# Patient Record
Sex: FEMALE | Race: BLACK OR AFRICAN AMERICAN | NOT HISPANIC OR LATINO | Employment: UNEMPLOYED | URBAN - METROPOLITAN AREA
[De-identification: names, ages, dates, MRNs, and addresses within clinical notes are randomized per-mention and may not be internally consistent; named-entity substitution may affect disease eponyms.]

---

## 2019-09-24 ENCOUNTER — OFFICE VISIT - HEALTHEAST (OUTPATIENT)
Dept: FAMILY MEDICINE | Facility: CLINIC | Age: 64
End: 2019-09-24

## 2019-09-24 DIAGNOSIS — R10.13 ABDOMINAL PAIN, EPIGASTRIC: ICD-10-CM

## 2019-09-24 LAB
ALBUMIN UR-MCNC: NEGATIVE MG/DL
APPEARANCE UR: CLEAR
BILIRUB UR QL STRIP: NEGATIVE
COLOR UR AUTO: YELLOW
GLUCOSE UR STRIP-MCNC: NEGATIVE MG/DL
HGB UR QL STRIP: NEGATIVE
KETONES UR STRIP-MCNC: NEGATIVE MG/DL
LEUKOCYTE ESTERASE UR QL STRIP: NEGATIVE
NITRATE UR QL: NEGATIVE
PH UR STRIP: 7 [PH] (ref 5–8)
SP GR UR STRIP: 1.01 (ref 1–1.03)
UROBILINOGEN UR STRIP-ACNC: NORMAL

## 2020-09-21 ENCOUNTER — OFFICE VISIT - HEALTHEAST (OUTPATIENT)
Dept: AUDIOLOGY | Facility: CLINIC | Age: 65
End: 2020-09-21

## 2020-09-21 ENCOUNTER — OFFICE VISIT - HEALTHEAST (OUTPATIENT)
Dept: OTOLARYNGOLOGY | Facility: CLINIC | Age: 65
End: 2020-09-21

## 2020-09-21 DIAGNOSIS — L29.9 EAR ITCH: ICD-10-CM

## 2020-09-21 DIAGNOSIS — H93.13 TINNITUS OF BOTH EARS: ICD-10-CM

## 2020-09-21 DIAGNOSIS — L29.9 ITCHING: ICD-10-CM

## 2020-09-21 DIAGNOSIS — L29.9 EAR ITCHING: ICD-10-CM

## 2020-09-21 DIAGNOSIS — H92.03 OTALGIA OF BOTH EARS: ICD-10-CM

## 2020-09-21 DIAGNOSIS — H92.03 EAR PAIN, BILATERAL: ICD-10-CM

## 2020-09-21 DIAGNOSIS — H90.3 SENSORINEURAL HEARING LOSS (SNHL) OF BOTH EARS: ICD-10-CM

## 2020-09-21 DIAGNOSIS — M26.609 TMJ (TEMPOROMANDIBULAR JOINT SYNDROME): ICD-10-CM

## 2020-09-21 DIAGNOSIS — H90.3 SENSORINEURAL HEARING LOSS, BILATERAL: ICD-10-CM

## 2021-01-11 ENCOUNTER — AMBULATORY - HEALTHEAST (OUTPATIENT)
Dept: OTOLARYNGOLOGY | Facility: CLINIC | Age: 66
End: 2021-01-11

## 2021-01-11 ENCOUNTER — OFFICE VISIT - HEALTHEAST (OUTPATIENT)
Dept: AUDIOLOGY | Facility: CLINIC | Age: 66
End: 2021-01-11

## 2021-01-11 ENCOUNTER — OFFICE VISIT - HEALTHEAST (OUTPATIENT)
Dept: OTOLARYNGOLOGY | Facility: CLINIC | Age: 66
End: 2021-01-11

## 2021-01-11 DIAGNOSIS — H90.3 SENSORINEURAL HEARING LOSS, BILATERAL: ICD-10-CM

## 2021-01-11 DIAGNOSIS — H90.3 SENSORINEURAL HEARING LOSS (SNHL) OF BOTH EARS: ICD-10-CM

## 2021-01-11 DIAGNOSIS — L29.9 EAR ITCHING: ICD-10-CM

## 2021-01-11 DIAGNOSIS — H92.09 EAR PAIN: ICD-10-CM

## 2021-01-11 DIAGNOSIS — R09.A2 GLOBUS SENSATION: ICD-10-CM

## 2021-03-09 ENCOUNTER — OFFICE VISIT - HEALTHEAST (OUTPATIENT)
Dept: OTOLARYNGOLOGY | Facility: CLINIC | Age: 66
End: 2021-03-09

## 2021-03-09 DIAGNOSIS — K11.20 SIALOADENITIS OF SUBMANDIBULAR GLAND: ICD-10-CM

## 2021-03-09 DIAGNOSIS — R09.A2 GLOBUS SENSATION: ICD-10-CM

## 2021-03-09 DIAGNOSIS — L29.9 EAR ITCHING: ICD-10-CM

## 2021-03-09 DIAGNOSIS — L29.9 ITCHING OF EAR: ICD-10-CM

## 2021-03-09 DIAGNOSIS — H90.3 SENSORINEURAL HEARING LOSS (SNHL) OF BOTH EARS: ICD-10-CM

## 2021-03-09 DIAGNOSIS — K21.9 LPRD (LARYNGOPHARYNGEAL REFLUX DISEASE): ICD-10-CM

## 2021-03-09 RX ORDER — POLYETHYLENE GLYCOL 3350 17 G/17G
17 POWDER, FOR SOLUTION ORAL
Status: SHIPPED | COMMUNITY
Start: 2020-02-28

## 2021-03-09 RX ORDER — FLUOCINONIDE 0.5 MG/G
CREAM TOPICAL
Qty: 30 G | Refills: 3 | Status: SHIPPED | OUTPATIENT
Start: 2021-03-09

## 2021-03-09 RX ORDER — CETIRIZINE HYDROCHLORIDE 10 MG/1
10 TABLET ORAL
Status: SHIPPED | COMMUNITY
Start: 2020-02-28

## 2021-06-01 NOTE — PROGRESS NOTES
Chief Complaint   Patient presents with     Abdominal Pain     9/23/19, started from the front to the back, has been having BMs, a little pain with urination       HPI      Patient is here for one day of moderate epigastric and upper abdominal pain radiating to her back, described as burning pain. Pain has no relationship to foods. No nausea, vomiting, fever, chills, urinary symptoms. No hx of abdominal surgeries.     ROS: Pertinent ROS noted in HPI.     No Known Allergies    There is no problem list on file for this patient.      No family history on file.    Social History     Socioeconomic History     Marital status:      Spouse name: Not on file     Number of children: Not on file     Years of education: Not on file     Highest education level: Not on file   Occupational History     Not on file   Social Needs     Financial resource strain: Not on file     Food insecurity:     Worry: Not on file     Inability: Not on file     Transportation needs:     Medical: Not on file     Non-medical: Not on file   Tobacco Use     Smoking status: Never Smoker     Smokeless tobacco: Never Used   Substance and Sexual Activity     Alcohol use: Not on file     Drug use: Not on file     Sexual activity: Not on file   Lifestyle     Physical activity:     Days per week: Not on file     Minutes per session: Not on file     Stress: Not on file   Relationships     Social connections:     Talks on phone: Not on file     Gets together: Not on file     Attends Jainism service: Not on file     Active member of club or organization: Not on file     Attends meetings of clubs or organizations: Not on file     Relationship status: Not on file     Intimate partner violence:     Fear of current or ex partner: Not on file     Emotionally abused: Not on file     Physically abused: Not on file     Forced sexual activity: Not on file   Other Topics Concern     Not on file   Social History Narrative     Not on file          Objective:    Vitals:    09/24/19 1748   BP: 108/70   Pulse: 94   Resp: 16   Temp: 99.4  F (37.4  C)   SpO2: 98%       Gen:NAD  CV: RRR  Pulm: CTAB  Abd: normal inspection, normal bowel sounds, soft, moderate pain to palpation at epigastric area, mild pain at RUQ and LUQ, normal palpation of lower abdomen, no mass/HSM  MSK: mild generalized tenderness to palpation of the entire back.      Recent Results (from the past 24 hour(s))   Urinalysis-UC if Indicated   Result Value Ref Range    Color, UA Yellow Colorless, Yellow, Straw, Light Yellow    Clarity, UA Clear Clear    Glucose, UA Negative Negative    Bilirubin, UA Negative Negative    Ketones, UA Negative Negative    Specific Gravity, UA 1.010 1.005 - 1.030    Blood, UA Negative Negative    pH, UA 7.0 5.0 - 8.0    Protein, UA Negative Negative mg/dL    Urobilinogen, UA 0.2 E.U./dL 0.2 E.U./dL, 1.0 E.U./dL    Nitrite, UA Negative Negative    Leukocytes, UA Negative Negative         Abdominal pain, epigastric  -     Urinalysis-UC if Indicated  -     aluminum-magnesium hydroxide-simethicone 15 mL, viscous lidocaine HC 15 mL (GI COCKTAIL)  -     ranitidine (ZANTAC) 150 MG tablet; Take 1 tablet (150 mg total) by mouth 2 (two) times a day.      Patient reported significant improvement with GI cocktail suggesting gastritis/PUD. NO further evaluation necessary. Will discharge with Zantac to f/u with PCP.

## 2021-06-03 VITALS
DIASTOLIC BLOOD PRESSURE: 70 MMHG | OXYGEN SATURATION: 98 % | SYSTOLIC BLOOD PRESSURE: 108 MMHG | RESPIRATION RATE: 16 BRPM | HEART RATE: 94 BPM | TEMPERATURE: 99.4 F | WEIGHT: 149 LBS

## 2021-06-11 NOTE — PROGRESS NOTES
CHIEF COMPLAINT:   Chief Complaint   Patient presents with     Ear Pain     BL ear pain/itching x 2 wks. No hearing loss.         HISTORY OF PRESENT ILLNESS    George was seen at the behest of No ref. provider found for ear pain x 2 weeks.  No associated drainage.   Very painful and itchy.   Pain radiates to her nose.   History via phone .   Alleviating/aggravating factors: none.   Patient is relatively poor historian.  She has had ear pain the past but not this bad.  Pain severity rating:            Chief Complaint   Patient presents with     Ear Pain     BL ear pain/itching x 2 wks. No hearing loss.            REVIEW OF SYSTEMS    Review of Systems: a 10-system review is reviewed at this encounter.  See scanned document.     Patient has no known allergies.     PHYSICAL EXAM:        HEAD: Normal appearance and symmetry:  No cutaneous lesions.      EARS:    Normal TM's bilaterally. Normal auditory canals and external ears.   Right TMJ is tender to palpation         NOSE:    Dorsum:   straight  Septum:  Normal  Mucosa:  moist  Inferior turbinates:  2+       ORAL CAVITY/OROPHARYNX:    Lips:  Normal.  Tongue: normal, midline  Mucosa:   no lesions  Tonsils:  2+      NECK:  Trachea:  midline.   Thyroid:  normal   Adenopathy:  none       NEURO:   Alert and Oriented    GAIT AND STATION:  normal     RESPIRATORY:   Symmetry and Respiratory effort         IMPRESSION:    Encounter Diagnoses   Name Primary?     Itching Yes     Ear itching      TMJ (temporomandibular joint syndrome)      Otalgia of both ears           RECOMMENDATIONS:      No orders of the defined types were placed in this encounter.     Medications Ordered   Medications     fluocinonide (LIDEX) 0.05 % cream     Sig: Apply small amount to ear canal daily as needed for itching     Dispense:  30 g     Refill:  3     methylPREDNISolone (MEDROL DOSEPACK) 4 mg tablet     Sig: Follow package directions (take with food)     Dispense:  21 tablet     Refill:  0          Patient is instructed regarding conservative measures to treat TMJ related pain.  This would include massage warm compress and soft diet.  We will treat her with a Medrol Dosepak just to bring the inflammation down and hopefully control her pain going forward.  She can use Lidex ointment daily to the affected ear for itching.  Audiogram today shows some high-frequency hearing loss.  Recommend return visit in 1 year for repeat hearing test.    Follow-up on an as-needed basis

## 2021-06-15 NOTE — PROGRESS NOTES
REASON FOR VISIT: Recheck      HISTORY OF PRESENT ILLNESS    George was seen in follow up for LPR D and ear itching.  She was complaining last time of excessive mucus in the throat and was placed on omeprazole.  This seems to have gotten better but patient is really not concerned about this issues where she is really more concerned about the itching as of her years which is keeping her up at night.  The itching is to the point where she feels like there is a bug crawling around and she cannot sleep.  She is also complaining about a little bit of swelling in the floor of her mouth.  She refuses to fill out her reflux symptom questionnaire today.    IMPRESSION:          Encounter Diagnoses   Name Primary?     Sensorineural hearing loss (SNHL) of both ears Yes     Ear itching              RECOMMENDATIONS:        Symptoms are consistent with LPRD.  Exam is normal with the exception exception of thickened secretions.  We will stop the Zantac and start her on omeprazole 20 mg in the morning.  Return in 2 months.  All questions were answered she is agreeable to plan of care       REVIEW OF SYSTEMS    Review of Systems: a 10-system review is reviewed at this encounter.  See scanned document.     Patient has no known allergies.     PHYSICAL EXAM:        HEAD: Normal appearance and symmetry:  No cutaneous lesions.      EARS:   Auricles normal    External auditory canals are within normal limits tympanic membrane's are intact     NOSE:    Dorsum:   straight       ORAL CAVITY/OROPHARYNX:    Floor of mouth shows a little bit of swelling at this of the submandibular duct on the left-hand side with just a little bit of inspissated thickened mucus that is expressed with a Q-tip  Lips:  Normal.     NECK:  Trachea:  midline       NEURO:   Alert and Oriented    GAIT AND STATION:  normal     RESPIRATORY:   Symmetry and Respiratory effort    PSYCH:   normal mood and affect    SKIN:  warm and dry         IMPRESSION:    Encounter Diagnoses    Name Primary?     Sensorineural hearing loss (SNHL) of both ears Yes     Globus sensation      LPRD (laryngopharyngeal reflux disease)      Itching of ear      Ear itching      Sialoadenitis of submandibular gland           RECOMMENDATIONS:    Patient is rewritten for Lidex cream that she says the pharmacy would not fill last time.  I will write it for downstairs so she can get it immediately she can apply this daily with a Q-tip.  For the floor mild swelling I think this is some I did encourage her to drink plenty of fluids and massage the submandibular gland as needed she can also try sour candies such as lemon drops to help with salivation.  All questions were answered she is agreeable to this plan of care    No orders of the defined types were placed in this encounter.     Medications Ordered   Medications     fluocinonide (LIDEX) 0.05 % cream     Sig: Apply small amount to ear canal daily as needed for itching     Dispense:  30 g     Refill:  3

## 2021-06-18 NOTE — PATIENT INSTRUCTIONS - HE
"Patient Instructions by Ranjan Brock MD at 9/21/2020 10:00 AM     Author: Ranjan Brock MD Service: -- Author Type: Physician    Filed: 9/21/2020 10:18 AM Encounter Date: 9/21/2020 Status: Addendum    : Ranjan Brock MD (Physician)    Related Notes: Original Note by Ranjan Brock MD (Physician) filed at 9/21/2020 10:16 AM           Bend wire to 90 degrees so that the \"elbow) is just at the point where the cotton swab begins.  (this will prevent you from putting the cotton tip in too far and damaging the eardrum)  Apply small amount of steroid cream to cotton tip.  The cream is potent so you only need a small dab on the swab. Patient Education     TMJ Syndrome  The temporomandibular joint (TMJ) is the joint that connects your lower jaw to your head. You can feel it in front of your ears when you open and close your mouth. TMJ disorders involve chronic or recurrent pain in the joint. When treated, symptoms of TMJ disorders usually go away within a few months.  Causes  There is no widely agreed-on cause of TMJ disorders. They have been linked to injury, arthritis, chronic fatigue syndrome, and fibromyalgia. A definite connection has not been shown, though.  Symptoms    Pain in the face, jaw, or neck    Pain with jaw movement or chewing    Locking or catching sensation of the jaw    Clicking, popping, or grinding sounds with movement of the TMJ    Headache    Ear pain  Home care  Modest, nonsurgical treatments are a good first step toward relieving symptoms. Try the approaches described below.    Rest the jaw by avoiding crunchy or hard-to-chew foods. Dont eat hard or sticky candies. Soft foods and liquids are easier on the jaw.    Protect your jaw while yawning. If you need to yawn, put your fist under your chin to prevent your mouth from opening up too wide.    To help relieve pain, try applying hot or cold packs to the painful area. Try both hot and cold to find out which works best for you. To make a cold " pack, put ice cubes in a plastic bag that seals at the top. Wrap the bag in a clean, thin towel or cloth. Never put ice or an ice pack directly on the skin. If you use hot packs (small towels soaked in hot water), be careful not to burn yourself.    You may take acetaminophen or ibuprofen for pain, unless you were given a different pain medicine. (Note: If you have chronic liver or kidney disease or have ever had a stomach ulcer or gastrointestinal bleeding, talk with your healthcare provider before using these medicines. Also talk to your provider if you are taking medicine to prevent blood clots.) Dont give aspirin to a child younger than age 19 unless directed by the aleta provider. Taking aspirin can put a child at risk for Reye syndrome. This is a rare but very serious disorder that most often affects the brain and the liver.  Reducing stress  If stress seems to be contributing to your symptoms, try to identify the sources of stress in your life. These arent always obvious. Common stressors include:    Everyday hassles. These include things such as traffic jams, missed appointments, or car trouble.    Major life changes. These can be good, such as a new baby or job promotion. And they can be bad, such as losing a job or losing a loved one.    Overload. The feeling that you have too many responsibilities and can't take care of everything at once.    Helplessness. Feeling like your problems are more than you can solve.  When possible, do something about your sources of stress. See if you can avoid hassles, limit the amount of change in your life at one time, and take breaks when you feel overloaded.  Unfortunately, many stressful situations cannot be avoided. So learning how to manage stress better is very important. Getting regular exercise, eating nutritious, balanced meals, and getting adequate rest all help to make everyday stress more manageable. Certain techniques are also helpful: relaxation and breathing  exercises, visualization, biofeedback, meditation, or simply taking some time out to clear your mind. For more information, talk with your healthcare provider.  Follow-up care  Follow up with your healthcare provider, or as advised. Further testing and additional treatment may be required. If changes to your lifestyle do not improve your symptoms, talk with your healthcare provider about other available therapies. These include bite guards for help with teeth grinding, stress management techniques, and more. If stress is an important factor and does not respond to the above simple measures, talk with your healthcare provider about a referral for stress management.  If X-rays were done, they will be reviewed by a specialist. You will be notified of the results, especially if they affect treatment.  Call 911  Call 911 if any of these occur:    Trouble breathing or swallowing, wheezing    Confusion    Extreme drowsiness or trouble awakening    Fainting or loss of consciousness    Rapid heart rate  When to seek medical advice  Call your healthcare provider right away if any of these occur:    Swollen or red face    Pain gets worse    Neck, mouth, tooth, or throat pain gets worse    Fever of 100.4 F (38 C) or higher, or as directed by your healthcare provider  Date Last Reviewed: 10/1/2017    9624-4878 The [employer]). [address] [place] [address], [place] [address]. All rights reserved. This information is not intended as a substitute for professional medical care. Always follow your healthcare professional's instructions.

## 2021-06-29 NOTE — PROGRESS NOTES
"Progress Notes by Viri Cornejo AuD at 9/21/2020  9:20 AM     Author: Viri Cornejo AuD Service: -- Author Type: Audiologist    Filed: 9/21/2020 10:26 AM Encounter Date: 9/21/2020 Status: Signed    : Viri Cornejo AuD (Audiologist)       Audiology Report    Referring Provider:  Dr. Brock    Summary: Audiology visit completed. A telephone  is used during the visit. Please see audiogram below or in \"media\" tab for case history and results.      Transducer: Insert earphones and Circumaural headphones    Reliability was good  and there was fair  SDT to PTA agreement.       Plan:  The patient is returned to ENT for follow up.  She should return for retesting with ENT recommendation.      Kenna Leblanc, [facility]  [place] Licensed Audiologist #[id number]                      "

## 2021-06-30 NOTE — PROGRESS NOTES
Progress Notes by Ranjan Brock MD at 1/11/2021  9:20 AM     Author: Ranjan Brock MD Service: -- Author Type: Physician    Filed: 1/11/2021 10:28 AM Encounter Date: 1/11/2021 Status: Signed    : Ranjan Brock MD (Physician)       REASON FOR VISIT: Recheck      HISTORY OF PRESENT ILLNESS    George was seen in follow up for hearing loss and ear eczema.  Patient had improvement with the ear cream but she says she is worn out.  It did help with the ear itching.  She continues to have throat pain and itching on a regular basis she did have some temporary improvement with the Medrol Dosepak.  Other symptoms include excessive mucus and throat clearing.  RSI is administered to the patient via  she scores a 27.    IMPRESSION:          Encounter Diagnoses   Name Primary?   ? Itching Yes   ? Ear itching     ? TMJ (temporomandibular joint syndrome)     ? Otalgia of both ears              RECOMMENDATIONS:        No orders of the defined types were placed in this encounter.          Medications Ordered   Medications   ? fluocinonide (LIDEX) 0.05 % cream       Sig: Apply small amount to ear canal daily as needed for itching       Dispense:  30 g       Refill:  3   ? methylPREDNISolone (MEDROL DOSEPACK) 4 mg tablet       Sig: Follow package directions (take with food)       Dispense:  21 tablet       Refill:  0         Patient is instructed regarding conservative measures to treat TMJ related pain.  This would include massage warm compress and soft diet.  We will treat her with a Medrol Dosepak just to bring the inflammation down and hopefully control her pain going forward.  She can use Lidex ointment daily to the affected ear for itching.  Audiogram today shows some high-frequency hearing loss.  Recommend return visit in 1 year for repeat hearing test.     Follow-up on an as-needed basis              Initial consult on 9/21/2020         REVIEW OF SYSTEMS    Review of Systems: a 10-system review is reviewed at  this encounter.  See scanned document.     Patient has no known allergies.     PHYSICAL EXAM:        HEAD: Normal appearance and symmetry:  No cutaneous lesions.      EARS:   Auricles normal         NOSE:    Dorsum:   straight       ORAL CAVITY/OROPHARYNX:    Lips:  Normal.     NECK:  Trachea:  midline       NEURO:   Alert and Oriented    GAIT AND STATION:  normal     RESPIRATORY:   Symmetry and Respiratory effort    PSYCH:   normal mood and affect    SKIN:  warm and dry     Flexible laryngoscopy was performed in the clinic after obtaining verbal consent.  The nasal cavity is clear nasopharynx is clear pharynx is within normal limits as well is the larynx.  The true vocal cords are sharp with good movement.  There are some copious secretions that are thickened and mucoid in nature.    IMPRESSION:    Encounter Diagnoses   Name Primary?   ? Sensorineural hearing loss (SNHL) of both ears Yes   ? Ear itching           RECOMMENDATIONS:      Symptoms are consistent with LPR D.  Exam is normal with the exception exception of thickened secretions.  We will stop the Zantac and start her on omeprazole 20 mg in the morning.  Return in 2 months.  All questions were answered she is agreeable to plan of care

## 2021-06-30 NOTE — PROGRESS NOTES
"Progress Notes by Patsy Peñaloza AuD at 1/11/2021  8:40 AM     Author: Patsy Peñaloza AuD Service: -- Author Type: Audiologist    Filed: 1/11/2021  9:31 AM Encounter Date: 1/11/2021 Status: Signed    : Patsy Peñaloza AuD (Audiologist)       Audiology Report    Summary: Audiology visit completed. Please see audiogram below or in \"media\" tab for case history and results.     Plan: The patient is returned to ENT for follow up. Return for retesting with ENT recommendation.     Kenna Martinez, CCC-A  Clinical Audiologist   [place] #[id number]             "

## 2021-07-04 NOTE — ADDENDUM NOTE
Addendum Note by Yue Brock MD at 3/9/2021 10:20 AM     Author: Yue Brock MD Service: -- Author Type: Physician    Filed: 3/9/2021 10:48 AM Encounter Date: 3/9/2021 Status: Signed    : Yue Brock MD (Physician)    Addended by: YUE BROCK on: 3/9/2021 10:48 AM        Modules accepted: Level of Service

## 2021-10-26 ENCOUNTER — APPOINTMENT (OUTPATIENT)
Dept: CT IMAGING | Facility: HOSPITAL | Age: 66
End: 2021-10-26
Attending: EMERGENCY MEDICINE
Payer: COMMERCIAL

## 2021-10-26 ENCOUNTER — HOSPITAL ENCOUNTER (EMERGENCY)
Facility: HOSPITAL | Age: 66
Discharge: HOME | End: 2021-10-26
Attending: EMERGENCY MEDICINE | Admitting: EMERGENCY MEDICINE
Payer: COMMERCIAL

## 2021-10-26 VITALS
SYSTOLIC BLOOD PRESSURE: 130 MMHG | WEIGHT: 160 LBS | TEMPERATURE: 99.1 F | OXYGEN SATURATION: 96 % | RESPIRATION RATE: 16 BRPM | HEART RATE: 81 BPM | DIASTOLIC BLOOD PRESSURE: 77 MMHG

## 2021-10-26 DIAGNOSIS — N12 PYELONEPHRITIS: ICD-10-CM

## 2021-10-26 LAB
ALBUMIN SERPL-MCNC: 3.9 G/DL (ref 3.5–5)
ALBUMIN UR-MCNC: NEGATIVE MG/DL
ALP SERPL-CCNC: 99 U/L (ref 45–120)
ALT SERPL W P-5'-P-CCNC: 13 U/L (ref 0–45)
ANION GAP SERPL CALCULATED.3IONS-SCNC: 10 MMOL/L (ref 5–18)
APPEARANCE UR: CLEAR
AST SERPL W P-5'-P-CCNC: 16 U/L (ref 0–40)
ATRIAL RATE - MUSE: 106 BPM
BASOPHILS # BLD AUTO: 0 10E3/UL (ref 0–0.2)
BASOPHILS NFR BLD AUTO: 1 %
BILIRUB SERPL-MCNC: 0.4 MG/DL (ref 0–1)
BILIRUB UR QL STRIP: NEGATIVE
BUN SERPL-MCNC: 10 MG/DL (ref 8–22)
C REACTIVE PROTEIN LHE: 1.3 MG/DL (ref 0–0.8)
CALCIUM SERPL-MCNC: 9.7 MG/DL (ref 8.5–10.5)
CHLORIDE BLD-SCNC: 106 MMOL/L (ref 98–107)
CO2 SERPL-SCNC: 24 MMOL/L (ref 22–31)
COLOR UR AUTO: ABNORMAL
CREAT SERPL-MCNC: 0.71 MG/DL (ref 0.6–1.1)
DIASTOLIC BLOOD PRESSURE - MUSE: NORMAL MMHG
EOSINOPHIL # BLD AUTO: 0.1 10E3/UL (ref 0–0.7)
EOSINOPHIL NFR BLD AUTO: 2 %
ERYTHROCYTE [DISTWIDTH] IN BLOOD BY AUTOMATED COUNT: 12.6 % (ref 10–15)
GFR SERPL CREATININE-BSD FRML MDRD: 88 ML/MIN/1.73M2
GLUCOSE BLD-MCNC: 116 MG/DL (ref 70–125)
GLUCOSE UR STRIP-MCNC: NEGATIVE MG/DL
HCT VFR BLD AUTO: 39.4 % (ref 35–47)
HGB BLD-MCNC: 12.7 G/DL (ref 11.7–15.7)
HGB UR QL STRIP: NEGATIVE
IMM GRANULOCYTES # BLD: 0 10E3/UL
IMM GRANULOCYTES NFR BLD: 0 %
INR PPP: 1.08 (ref 0.9–1.15)
INTERPRETATION ECG - MUSE: NORMAL
KETONES UR STRIP-MCNC: NEGATIVE MG/DL
LEUKOCYTE ESTERASE UR QL STRIP: ABNORMAL
LIPASE SERPL-CCNC: 14 U/L (ref 0–52)
LYMPHOCYTES # BLD AUTO: 3.1 10E3/UL (ref 0.8–5.3)
LYMPHOCYTES NFR BLD AUTO: 55 %
MCH RBC QN AUTO: 28.7 PG (ref 26.5–33)
MCHC RBC AUTO-ENTMCNC: 32.2 G/DL (ref 31.5–36.5)
MCV RBC AUTO: 89 FL (ref 78–100)
MONOCYTES # BLD AUTO: 0.4 10E3/UL (ref 0–1.3)
MONOCYTES NFR BLD AUTO: 7 %
MUCOUS THREADS #/AREA URNS LPF: PRESENT /LPF
NEUTROPHILS # BLD AUTO: 2 10E3/UL (ref 1.6–8.3)
NEUTROPHILS NFR BLD AUTO: 35 %
NITRATE UR QL: NEGATIVE
NRBC # BLD AUTO: 0 10E3/UL
NRBC BLD AUTO-RTO: 0 /100
P AXIS - MUSE: 31 DEGREES
PH UR STRIP: 6.5 [PH] (ref 5–7)
PLATELET # BLD AUTO: 231 10E3/UL (ref 150–450)
POTASSIUM BLD-SCNC: 4.1 MMOL/L (ref 3.5–5)
PR INTERVAL - MUSE: 116 MS
PROT SERPL-MCNC: 7.9 G/DL (ref 6–8)
QRS DURATION - MUSE: 78 MS
QT - MUSE: 328 MS
QTC - MUSE: 435 MS
R AXIS - MUSE: -4 DEGREES
RBC # BLD AUTO: 4.43 10E6/UL (ref 3.8–5.2)
RBC URINE: 1 /HPF
SODIUM SERPL-SCNC: 140 MMOL/L (ref 136–145)
SP GR UR STRIP: 1.02 (ref 1–1.03)
SQUAMOUS EPITHELIAL: 1 /HPF
SYSTOLIC BLOOD PRESSURE - MUSE: NORMAL MMHG
T AXIS - MUSE: 34 DEGREES
UROBILINOGEN UR STRIP-MCNC: <2 MG/DL
VENTRICULAR RATE- MUSE: 106 BPM
WBC # BLD AUTO: 5.5 10E3/UL (ref 4–11)
WBC URINE: 3 /HPF

## 2021-10-26 PROCEDURE — 85049 AUTOMATED PLATELET COUNT: CPT | Performed by: EMERGENCY MEDICINE

## 2021-10-26 PROCEDURE — 36415 COLL VENOUS BLD VENIPUNCTURE: CPT | Performed by: EMERGENCY MEDICINE

## 2021-10-26 PROCEDURE — 99285 EMERGENCY DEPT VISIT HI MDM: CPT | Mod: 25

## 2021-10-26 PROCEDURE — 81001 URINALYSIS AUTO W/SCOPE: CPT | Performed by: EMERGENCY MEDICINE

## 2021-10-26 PROCEDURE — 74176 CT ABD & PELVIS W/O CONTRAST: CPT

## 2021-10-26 PROCEDURE — 86141 C-REACTIVE PROTEIN HS: CPT | Performed by: EMERGENCY MEDICINE

## 2021-10-26 PROCEDURE — 85610 PROTHROMBIN TIME: CPT | Performed by: EMERGENCY MEDICINE

## 2021-10-26 PROCEDURE — 82040 ASSAY OF SERUM ALBUMIN: CPT | Performed by: EMERGENCY MEDICINE

## 2021-10-26 PROCEDURE — 250N000013 HC RX MED GY IP 250 OP 250 PS 637: Performed by: EMERGENCY MEDICINE

## 2021-10-26 PROCEDURE — 83690 ASSAY OF LIPASE: CPT | Performed by: EMERGENCY MEDICINE

## 2021-10-26 RX ORDER — CEPHALEXIN 500 MG/1
500 CAPSULE ORAL 2 TIMES DAILY
Qty: 20 CAPSULE | Refills: 0 | Status: SHIPPED | OUTPATIENT
Start: 2021-10-26 | End: 2021-11-05

## 2021-10-26 RX ORDER — ACETAMINOPHEN 325 MG/1
975 TABLET ORAL ONCE
Status: COMPLETED | OUTPATIENT
Start: 2021-10-26 | End: 2021-10-26

## 2021-10-26 RX ORDER — ONDANSETRON 2 MG/ML
4 INJECTION INTRAMUSCULAR; INTRAVENOUS EVERY 30 MIN PRN
Status: DISCONTINUED | OUTPATIENT
Start: 2021-10-26 | End: 2021-10-26 | Stop reason: HOSPADM

## 2021-10-26 RX ORDER — KETOROLAC TROMETHAMINE 15 MG/ML
15 INJECTION, SOLUTION INTRAMUSCULAR; INTRAVENOUS ONCE
Status: DISCONTINUED | OUTPATIENT
Start: 2021-10-26 | End: 2021-10-26 | Stop reason: HOSPADM

## 2021-10-26 RX ADMIN — ACETAMINOPHEN 975 MG: 325 TABLET ORAL at 12:42

## 2021-10-26 RX ADMIN — Medication 50 MG: at 12:44

## 2021-10-26 ASSESSMENT — ENCOUNTER SYMPTOMS
FEVER: 0
FLANK PAIN: 1
ABDOMINAL PAIN: 1
CHILLS: 0
FREQUENCY: 0
HEMATURIA: 0
DYSURIA: 0

## 2021-10-26 NOTE — DISCHARGE INSTRUCTIONS
Call your primary care today to schedule follow up on Friday or Monday    If you have fever, vomiting, worsening pain, or any concerning symptoms, return to the ER.    Take over the counter tylenol 1000mg every 6 hours as needed for pain

## 2021-10-26 NOTE — ED TRIAGE NOTES
Patient with LUQ abdominal pain with radiation into the flank since 0730.  Denies any urinary symptoms and denies any N/V.  States has had pain like this before but never evaluated.

## 2021-10-26 NOTE — ED PROVIDER NOTES
Patient information was obtained from: Patient    Use of Intrepreter: Yes (Family Member) - Language Nitza Myrick is a 66 year old female with a pertinent history of GERD who presents for evaluation of left sided flank pain near kidney, onset 7:30 AM. Describes it as a burning in her back. No urinary symptoms, fever, or chills. No history of kidney stones.      Concern for pyelonephritis vs. Ureterolith. Less likely aortic pathology based on hx, exam. Plan for lab assay to includeUA, and CT r/o stone. Toradol held d/tage >65 with last available CrCl from 2018       Sessions, MD [place]  10/26/21 1232

## 2021-10-26 NOTE — ED PROVIDER NOTES
EMERGENCY DEPARTMENT ENCOUNTER      NAME: George Myrick  AGE: 66 year old female  YOB: 1955  MRN: [id number]  EVALUATION DATE & TIME: No admission date for patient encounter.    PCP: System, Provider Not In    ED PROVIDER: Landon Tony M.D.      Chief Complaint   Patient presents with     Abdominal Pain         FINAL IMPRESSION:  1. Pyelonephritis          ED COURSE & MEDICAL DECISION MAKING:    Pertinent Labs & Imaging studies reviewed. (See chart for details)  66 year old female presents to the Emergency Department for evaluation of flank and abdominal pain. Serial abdominal exams benign. Patient w/o significant n/v/d/c. CT w/o ureterolith or other abnormality to explain symptoms. UA with some signs of infection and slight CRP elevation. Will treat for urinary tract infection.    ED Course as of Oct 26 1337   Tue Oct 26, 2021   1331 CrCl >30. Dose of ketorolac ordered. Patient remains nontoxic and well appearing. Urinalysis with LE, CRP slightly elevated. Will treat for pyelonephritis given flank pain. Counseled to follow up with pcm in 3-5 days.          12:13 PM Met with patient for initial interview and exam. Discussed initial plan for care for their stay in the emergency department.  1:30 PM I updated patient and family on results. Discussed plan for discharge home on antibiotics and they are agreeable.    At the conclusion of the encounter I discussed the results of all of the tests and the disposition. The questions were answered. The patient or family acknowledged understanding and was agreeable with the care plan.        minutes of critical care time     MEDICATIONS GIVEN IN THE EMERGENCY:  Medications   ondansetron (ZOFRAN) injection 4 mg (4 mg Intravenous Not Given 10/26/21 1245)   ketorolac (TORADOL) injection 15 mg (has no administration in time range)   dimenhyDRINATE chew tab 50 mg (50 mg Oral Given 10/26/21 1244)   acetaminophen (TYLENOL) tablet 975 mg (975 mg Oral Given 10/26/21  9180)       NEW PRESCRIPTIONS STARTED AT TODAY'S ER VISIT  New Prescriptions    CEPHALEXIN (KEFLEX) 500 MG CAPSULE    Take 1 capsule (500 mg) by mouth 2 times daily for 10 days          =================================================================    HPI    Patient information was obtained from: Patient     Use of Intrepreter: Yes (Family Member) - Language Nitza Myrick is a 66 year old female with a pertinent history of GERD who presents for evaluation of sudden onset left sided flank pain around 7:30 AM this morning (4.5 hours PTA). She describes it as a burning in her back near her kidney that wraps around to her left lower abdomen. No urinary symptoms, fever, or chills. No history of kidney stones. No reported palliating or provoking factors. Patient is an otherwise healthy individual without other complaints.      REVIEW OF SYSTEMS   Review of Systems   Constitutional: Negative for chills and fever.   Gastrointestinal: Positive for abdominal pain (radiating from back).   Genitourinary: Positive for flank pain. Negative for dysuria, frequency and hematuria.   All other systems reviewed and are negative.       PAST MEDICAL HISTORY:  No past medical history on file.    PAST SURGICAL HISTORY:  No past surgical history on file.        CURRENT MEDICATIONS:    cephALEXin (KEFLEX) 500 MG capsule  calcium carbonate (OS-GOLDIE) 600 mg calcium (1,500 mg) tablet  cetirizine (ZYRTEC) 10 MG tablet  cholecalciferol, vitamin D3, 50 mcg (2,000 unit) capsule  fluocinonide (LIDEX) 0.05 % cream  ketotifen (ZADITOR/ZYRTEC ITCHY EYES) 0.025 % (0.035 %) ophthalmic solution  methylPREDNISolone (MEDROL DOSEPACK) 4 mg tablet  polyethylene glycol (GLYCOLAX) 17 gram/dose powder        ALLERGIES:  No Known Allergies    FAMILY HISTORY:  No family history on file.    SOCIAL HISTORY:   Social History     Socioeconomic History     Marital status:      Spouse name: Not on file     Number of children: Not on file      Years of education: Not on file     Highest education level: Not on file   Occupational History     Not on file   Tobacco Use     Smoking status: Never Smoker     Smokeless tobacco: Never Used   Substance and Sexual Activity     Alcohol use: Not on file     Drug use: Not on file     Sexual activity: Not on file   Other Topics Concern     Not on file   Social History Narrative     Not on file     Social Determinants of Health     Financial Resource Strain:      Difficulty of Paying Living Expenses:    Food Insecurity:      Worried About Running Out of Food in the Last Year:      Ran Out of Food in the Last Year:    Transportation Needs:      Lack of Transportation (Medical):      Lack of Transportation (Non-Medical):    Physical Activity:      Days of Exercise per Week:      Minutes of Exercise per Session:    Stress:      Feeling of Stress :    Social Connections:      Frequency of Communication with Friends and Family:      Frequency of Social Gatherings with Friends and Family:      Attends Jainism Services:      Active Member of Clubs or Organizations:      Attends Club or Organization Meetings:      Marital Status:    Intimate Partner Violence:      Fear of Current or Ex-Partner:      Emotionally Abused:      Physically Abused:      Sexually Abused:        VITALS:  Patient Vitals for the past 24 hrs:   BP Temp Temp src Pulse Resp SpO2 Weight   10/26/21 1225 -- -- -- -- -- -- 72.6 kg (160 lb)   10/26/21 1222 130/83 99.1  F (37.3  C) Temporal 112 18 97 % --       PHYSICAL EXAM    General: A&O x 3 no apparent distress  HEENT: PERRL, EOMI, moist mucous membranes  Neck: Supple, no rigidity  Cardiovascular: 2+ distal pulses, cap refill less than 2 seconds. RRR No m/r/g  Pulmonary: Chest nontender, symmetrical rise, normal effort, no respiratory distress. Clear to auscultation bilaterally.  Abdomen: Nontender, no distention. No rebound, guarding, or rigidity.  Extremities: No clubbing, cyanosis, or  edema  Musculoskeletal: Gait normal; extremities atraumatic x4  Neuro: Cranial nerves II through XII intact, GCS 15; intact, symmetric strength and sensation x4 extremities  Skin: No rash, jaundice, pallor.  Warm dry and intact  Psych: Normal mood and affect     LAB:  All pertinent labs reviewed and interpreted.  Results for orders placed or performed during the hospital encounter of 10/26/21   CT Abdomen Pelvis w/o Contrast    Impression    IMPRESSION:     1.  No nephroureterolithiasis or hydronephrosis.     UA with Microscopic reflex to Culture    Specimen: Urine, Midstream   Result Value Ref Range    Color Urine Light Yellow Colorless, Straw, Light Yellow, Yellow    Appearance Urine Clear Clear    Glucose Urine Negative Negative mg/dL    Bilirubin Urine Negative Negative    Ketones Urine Negative Negative mg/dL    Specific Gravity Urine 1.016 1.001 - 1.030    Blood Urine Negative Negative    pH Urine 6.5 5.0 - 7.0    Protein Albumin Urine Negative Negative mg/dL    Urobilinogen Urine <2.0 <2.0 mg/dL    Nitrite Urine Negative Negative    Leukocyte Esterase Urine 75 Rell/uL (A) Negative    Mucus Urine Present (A) None Seen /LPF    RBC Urine 1 <=2 /HPF    WBC Urine 3 <=5 /HPF    Squamous Epithelials Urine 1 <=1 /HPF   Comprehensive metabolic panel   Result Value Ref Range    Sodium 140 136 - 145 mmol/L    Potassium 4.1 3.5 - 5.0 mmol/L    Chloride 106 98 - 107 mmol/L    Carbon Dioxide (CO2) 24 22 - 31 mmol/L    Anion Gap 10 5 - 18 mmol/L    Urea Nitrogen 10 8 - 22 mg/dL    Creatinine 0.71 0.60 - 1.10 mg/dL    Calcium 9.7 8.5 - 10.5 mg/dL    Glucose 116 70 - 125 mg/dL    Alkaline Phosphatase 99 45 - 120 U/L    AST 16 0 - 40 U/L    ALT 13 0 - 45 U/L    Protein Total 7.9 6.0 - 8.0 g/dL    Albumin 3.9 3.5 - 5.0 g/dL    Bilirubin Total 0.4 0.0 - 1.0 mg/dL    GFR Estimate 88 >60 mL/min/1.73m2   Result Value Ref Range    INR 1.08 0.90 - 1.15   C-Reactive Protein   Result Value Ref Range    CRP 1.3 (H) 0.0-<0.8 mg/dL    Result Value Ref Range    Lipase 14 0 - 52 U/L   CBC with platelets and differential   Result Value Ref Range    WBC Count 5.5 4.0 - 11.0 10e3/uL    RBC Count 4.43 3.80 - 5.20 10e6/uL    Hemoglobin 12.7 11.7 - 15.7 g/dL    Hematocrit 39.4 35.0 - 47.0 %    MCV 89 78 - 100 fL    MCH 28.7 26.5 - 33.0 pg    MCHC 32.2 31.5 - 36.5 g/dL    RDW 12.6 10.0 - 15.0 %    Platelet Count 231 150 - 450 10e3/uL    % Neutrophils 35 %    % Lymphocytes 55 %    % Monocytes 7 %    % Eosinophils 2 %    % Basophils 1 %    % Immature Granulocytes 0 %    NRBCs per 100 WBC 0 <1 /100    Absolute Neutrophils 2.0 1.6 - 8.3 10e3/uL    Absolute Lymphocytes 3.1 0.8 - 5.3 10e3/uL    Absolute Monocytes 0.4 0.0 - 1.3 10e3/uL    Absolute Eosinophils 0.1 0.0 - 0.7 10e3/uL    Absolute Basophils 0.0 0.0 - 0.2 10e3/uL    Absolute Immature Granulocytes 0.0 <=0.0 10e3/uL    Absolute NRBCs 0.0 10e3/uL       RADIOLOGY:  Reviewed all pertinent imaging. Please see official radiology report.  CT Abdomen Pelvis w/o Contrast   Final Result   IMPRESSION:       1.  No nephroureterolithiasis or hydronephrosis.             I, Agata Mejia, am serving as a scribe to document services personally performed by Dr. Landon Tony based on my observation and the provider's statements to me. I, Landon Tony MD attest that Agata Mejia is acting in a scribe capacity, has observed my performance of the services and has documented them in accordance with my direction.  Any spelling or grammatic inconsistencies or inaccuracies are typographical or dictation errors.    Landon Tony M.D.  Emergency Medicine  [facility] EMERGENCY DEPARTMENT  [facility] [facility] [id number] [phone]  Dept: [phone]       Landon Tony MD  10/27/21 1203

## 2023-01-27 ENCOUNTER — OFFICE VISIT (OUTPATIENT)
Dept: FAMILY MEDICINE | Facility: CLINIC | Age: 68
End: 2023-01-27
Payer: COMMERCIAL

## 2023-01-27 VITALS
RESPIRATION RATE: 26 BRPM | HEART RATE: 101 BPM | TEMPERATURE: 98.9 F | DIASTOLIC BLOOD PRESSURE: 83 MMHG | SYSTOLIC BLOOD PRESSURE: 145 MMHG | WEIGHT: 156.7 LBS | OXYGEN SATURATION: 96 %

## 2023-01-27 DIAGNOSIS — R07.0 THROAT PAIN: ICD-10-CM

## 2023-01-27 DIAGNOSIS — J02.0 STREP THROAT: Primary | ICD-10-CM

## 2023-01-27 LAB — DEPRECATED S PYO AG THROAT QL EIA: POSITIVE

## 2023-01-27 PROCEDURE — 99203 OFFICE O/P NEW LOW 30 MIN: CPT | Performed by: PHYSICIAN ASSISTANT

## 2023-01-27 PROCEDURE — 87880 STREP A ASSAY W/OPTIC: CPT | Performed by: PHYSICIAN ASSISTANT

## 2023-01-27 RX ORDER — LEVOTHYROXINE SODIUM 50 UG/1
50 TABLET ORAL EVERY MORNING
COMMUNITY
Start: 2022-11-16

## 2023-01-27 RX ORDER — POLYETHYLENE GLYCOL 3350 17 G/17G
17 POWDER, FOR SOLUTION ORAL
COMMUNITY
Start: 2022-11-09 | End: 2023-08-21

## 2023-01-27 RX ORDER — AMOXICILLIN 875 MG
875 TABLET ORAL 2 TIMES DAILY
Qty: 20 TABLET | Refills: 0 | Status: SHIPPED | OUTPATIENT
Start: 2023-01-27 | End: 2023-02-06

## 2023-01-27 NOTE — PROGRESS NOTES
Assessment & Plan     Strep throat  Amoxil as ordered.  Push fluids, rest and ibuprofen or tylenol for comfort.    RTC for persistent or worsening sx.   PI given and discussed. Follow-up prn.    - amoxicillin (AMOXIL) 875 MG tablet  Dispense: 20 tablet; Refill: 0    Throat pain    - Streptococcus A Rapid Screen w/Reflex to PCR - Clinic Collect       [facility] ROSEMARY Vazquez is a 67 year old female who presents to clinic today for the following health issues:  Chief Complaint   Patient presents with     Pharyngitis     X last night. No cough, no congestion, some headaches, on/off fever. Throat pain, no white spots. No vomit/nausea.     HPI    Patient presents to urgent care accompanied by her son with concerns regarding sore throat x1 day.  She states her daughter had a positive strep test within the last day as well.  She has a headache but denies any URI symptoms including rhinorrhea nasal congestion or cough.  She is tolerating fluids and solids without difficulty but is painful. No vomiting or diarrhea. No rash.        Review of Systems  Constitutional, HEENT, cardiovascular, pulmonary, gi and gu systems are negative, except as otherwise noted.      Objective    BP (!) 145/83 (BP Location: Right arm, Patient Position: Sitting, Cuff Size: Adult Regular)   Pulse 101   Temp 98.9  F (37.2  C) (Oral)   Resp 26   Wt 71.1 kg (156 lb 11.2 oz)   SpO2 96%   Physical Exam   Pt is in no acute distress and appears well  Ears patent B:  TM s intact, non-injected. All land marks easily visibile    Nasal mucosa is non-edematous, no discharge.    Pharynx: erythematous, tonsils 2+ hypertrophied, No exudate   Neck supple: Tender anterior cervical adenopathy  Lungs: CTA  Heart: RRR, no murmur, no thrills or heaves   Ext: no edema  Skin: no rashes    Results for orders placed or performed in visit on 01/27/23   Streptococcus A Rapid Screen w/Reflex to PCR - Clinic  Collect     Status: Abnormal    Specimen: Throat; Swab   Result Value Ref Range    Group A Strep antigen Positive (A) Negative

## 2023-08-21 ENCOUNTER — OFFICE VISIT (OUTPATIENT)
Dept: FAMILY MEDICINE | Facility: CLINIC | Age: 68
End: 2023-08-21
Payer: COMMERCIAL

## 2023-08-21 VITALS
SYSTOLIC BLOOD PRESSURE: 149 MMHG | TEMPERATURE: 98.1 F | HEART RATE: 85 BPM | RESPIRATION RATE: 18 BRPM | WEIGHT: 160.8 LBS | OXYGEN SATURATION: 99 % | DIASTOLIC BLOOD PRESSURE: 77 MMHG

## 2023-08-21 DIAGNOSIS — G89.29 CHRONIC BILATERAL LOW BACK PAIN WITH LEFT-SIDED SCIATICA: ICD-10-CM

## 2023-08-21 DIAGNOSIS — K59.00 CONSTIPATION, UNSPECIFIED CONSTIPATION TYPE: ICD-10-CM

## 2023-08-21 DIAGNOSIS — R35.0 INCREASED FREQUENCY OF URINATION: Primary | ICD-10-CM

## 2023-08-21 DIAGNOSIS — K21.9 GASTROESOPHAGEAL REFLUX DISEASE, UNSPECIFIED WHETHER ESOPHAGITIS PRESENT: ICD-10-CM

## 2023-08-21 DIAGNOSIS — M54.42 CHRONIC BILATERAL LOW BACK PAIN WITH LEFT-SIDED SCIATICA: ICD-10-CM

## 2023-08-21 LAB
ALBUMIN UR-MCNC: NEGATIVE MG/DL
APPEARANCE UR: CLEAR
BILIRUB UR QL STRIP: NEGATIVE
COLOR UR AUTO: YELLOW
GLUCOSE UR STRIP-MCNC: NEGATIVE MG/DL
HGB UR QL STRIP: NEGATIVE
KETONES UR STRIP-MCNC: NEGATIVE MG/DL
LEUKOCYTE ESTERASE UR QL STRIP: NEGATIVE
NITRATE UR QL: NEGATIVE
PH UR STRIP: 5.5 [PH] (ref 5–8)
SP GR UR STRIP: 1.01 (ref 1–1.03)
UROBILINOGEN UR STRIP-ACNC: 0.2 E.U./DL

## 2023-08-21 PROCEDURE — 81003 URINALYSIS AUTO W/O SCOPE: CPT | Performed by: STUDENT IN AN ORGANIZED HEALTH CARE EDUCATION/TRAINING PROGRAM

## 2023-08-21 PROCEDURE — 99203 OFFICE O/P NEW LOW 30 MIN: CPT | Performed by: STUDENT IN AN ORGANIZED HEALTH CARE EDUCATION/TRAINING PROGRAM

## 2023-08-21 RX ORDER — CYCLOBENZAPRINE HCL 5 MG
5 TABLET ORAL
Qty: 60 TABLET | Refills: 0 | Status: SHIPPED | OUTPATIENT
Start: 2023-08-21

## 2023-08-21 RX ORDER — FAMOTIDINE 20 MG/1
20 TABLET, FILM COATED ORAL 2 TIMES DAILY
Qty: 60 TABLET | Refills: 0 | Status: SHIPPED | OUTPATIENT
Start: 2023-08-21 | End: 2024-03-16

## 2023-08-21 RX ORDER — POLYETHYLENE GLYCOL 3350 17 G/17G
17 POWDER, FOR SOLUTION ORAL DAILY
Qty: 510 G | Refills: 0 | Status: SHIPPED | OUTPATIENT
Start: 2023-08-21

## 2023-08-22 NOTE — PROGRESS NOTES
Assessment & Plan     Constipation, unspecified constipation type  Chronic bilateral low back pain with left-sided sciatica  Increased frequency of urination  Discussed that her differently for the abdominal and back pain included constipation, urinary tract infection, nephrolithiasis, GERD, cholelithiasis, gastritis, diverticulitis, inflammatory bowel disease. Her most likely diagnosis is due to constipation and GERD. UA is negative at this time. We will plan to have patient treat her GERD symptoms with famotidine at this time and follow up with a PCP within 1 month. In addition will refill the Miralax so that she can take this daily to help with the constipation and see if this helps to improve her symptoms. Also recommended she check her stool for blood or dark stools. In addition will plan for treatment of her back pain and leg pain with cyclobenzaprine at bedtime to see if she has additional pain relief as I would not recommend NSAIDs given her current heartburn symptoms and he report that acetaminophen has not been as helpful.   - UA Macroscopic with reflex to Microscopic and Culture - Clinic Collect  - cyclobenzaprine (FLEXERIL) 5 MG tablet  Dispense: 60 tablet; Refill: 0  - polyethylene glycol (MIRALAX) 17 GM/Dose powder  Dispense: 510 g; Refill: 0    Gastroesophageal reflux disease, unspecified whether esophagitis present  Treatment BID with famotidine and follow up with PCP.   - famotidine (PEPCID) 20 MG tablet  Dispense: 60 tablet; Refill: 0     38 minutes spent by me on the date of the encounter doing chart review, history and exam, documentation and further activities per the note      Return in about 4 weeks (around 9/18/2023) for Follow up- establish care with PCP.    Megan Fonseca MD  [facility]    Vera Vazquez is a 68 year old female who presents to clinic today for the following health issues:  Chief Complaint   Patient presents with    Flank Pain     Pt  states started about 2 weeks flank pain frequency urination,back pain,headache and right leg pain      HPI    Abdominal Pain    Location: RUQ and RLQ   Radiation: back.    Pain character: burning in abdomen and flank  Severity: Severe  Duration: 2 week(s)   Course of Illness: fluctuating.  Exacerbated by: nothing  Relieved by: medication for constipation but not significant relief.  Associated Symptoms: diarrhea, constipation, dysuria, frequency, and headache.  Surgical History: none  Burning sensation in her abdomen, heartburn. Feet, back, right leg and headache. Worse at night feels better during the day. Has been going on for more than two weeks. Constipation. Reports urgency and frequency without burning.    History gathered with [facility]     Review of Systems  Constitutional, HEENT, cardiovascular, pulmonary, gi and gu systems are negative, except as otherwise noted.      Objective    BP (!) 149/77 (BP Location: Right arm, Patient Position: Sitting, Cuff Size: Adult Regular)   Pulse 85   Temp 98.1  F (36.7  C) (Oral)   Resp 18   Wt 72.9 kg (160 lb 12.8 oz)   SpO2 99%   Physical Exam d  GENERAL: healthy, alert and no distress  NECK: no adenopathy, no asymmetry, masses, or scars and thyroid normal to palpation  RESP: lungs clear to auscultation - no rales, rhonchi or wheezes  CV: regular rate and rhythm, normal S1 S2, no S3 or S4, no murmur, click or rub, no peripheral edema and peripheral pulses strong  ABDOMEN: tenderness epigastric, RUQ, and RLQ, no organomegaly or masses, liver span normal to percussion, and bowel sounds normal  MS: no gross musculoskeletal defects noted, no edema  SKIN: no suspicious lesions or rashes  BACK: no CVA tenderness, bilateral paralumbar tenderness between L1-L4    Results for orders placed or performed in visit on 08/21/23 (from the past 24 hour(s))   UA Macroscopic with reflex to Microscopic and Culture - Clinic Collect    Specimen: Urine, Clean Catch    Result Value Ref Range    Color Urine Yellow Colorless, Straw, Light Yellow, Yellow    Appearance Urine Clear Clear    Glucose Urine Negative Negative mg/dL    Bilirubin Urine Negative Negative    Ketones Urine Negative Negative mg/dL    Specific Gravity Urine 1.015 1.005 - 1.030    Blood Urine Negative Negative    pH Urine 5.5 5.0 - 8.0    Protein Albumin Urine Negative Negative mg/dL    Urobilinogen Urine 0.2 0.2, 1.0 E.U./dL    Nitrite Urine Negative Negative    Leukocyte Esterase Urine Negative Negative    Narrative    Microscopic not indicated

## 2024-03-16 ENCOUNTER — HOSPITAL ENCOUNTER (EMERGENCY)
Facility: HOSPITAL | Age: 69
Discharge: HOME | End: 2024-03-16
Attending: EMERGENCY MEDICINE | Admitting: EMERGENCY MEDICINE
Payer: COMMERCIAL

## 2024-03-16 ENCOUNTER — APPOINTMENT (OUTPATIENT)
Dept: CT IMAGING | Facility: HOSPITAL | Age: 69
End: 2024-03-16
Attending: EMERGENCY MEDICINE
Payer: COMMERCIAL

## 2024-03-16 VITALS
WEIGHT: 154.3 LBS | DIASTOLIC BLOOD PRESSURE: 86 MMHG | TEMPERATURE: 98.7 F | HEART RATE: 79 BPM | HEIGHT: 60 IN | SYSTOLIC BLOOD PRESSURE: 141 MMHG | BODY MASS INDEX: 30.29 KG/M2 | OXYGEN SATURATION: 97 % | RESPIRATION RATE: 23 BRPM

## 2024-03-16 DIAGNOSIS — R10.13 EPIGASTRIC PAIN: ICD-10-CM

## 2024-03-16 DIAGNOSIS — R14.2 BURPING: ICD-10-CM

## 2024-03-16 LAB
ALBUMIN SERPL BCG-MCNC: 4.1 G/DL (ref 3.5–5.2)
ALP SERPL-CCNC: 112 U/L (ref 40–150)
ALT SERPL W P-5'-P-CCNC: 15 U/L (ref 0–50)
ANION GAP SERPL CALCULATED.3IONS-SCNC: 10 MMOL/L (ref 7–15)
AST SERPL W P-5'-P-CCNC: 19 U/L (ref 0–45)
BASOPHILS # BLD AUTO: 0 10E3/UL (ref 0–0.2)
BASOPHILS NFR BLD AUTO: 0 %
BILIRUB DIRECT SERPL-MCNC: <0.2 MG/DL (ref 0–0.3)
BILIRUB SERPL-MCNC: 0.3 MG/DL
BUN SERPL-MCNC: 9 MG/DL (ref 8–23)
CALCIUM SERPL-MCNC: 9.9 MG/DL (ref 8.8–10.2)
CHLORIDE SERPL-SCNC: 104 MMOL/L (ref 98–107)
CREAT SERPL-MCNC: 0.72 MG/DL (ref 0.51–0.95)
DEPRECATED HCO3 PLAS-SCNC: 25 MMOL/L (ref 22–29)
EGFRCR SERPLBLD CKD-EPI 2021: 90 ML/MIN/1.73M2
EOSINOPHIL # BLD AUTO: 0 10E3/UL (ref 0–0.7)
EOSINOPHIL NFR BLD AUTO: 1 %
ERYTHROCYTE [DISTWIDTH] IN BLOOD BY AUTOMATED COUNT: 13.2 % (ref 10–15)
GLUCOSE SERPL-MCNC: 106 MG/DL (ref 70–99)
HCT VFR BLD AUTO: 40.4 % (ref 35–47)
HGB BLD-MCNC: 13.1 G/DL (ref 11.7–15.7)
IMM GRANULOCYTES # BLD: 0 10E3/UL
IMM GRANULOCYTES NFR BLD: 0 %
LIPASE SERPL-CCNC: 23 U/L (ref 13–60)
LYMPHOCYTES # BLD AUTO: 1.9 10E3/UL (ref 0.8–5.3)
LYMPHOCYTES NFR BLD AUTO: 37 %
MCH RBC QN AUTO: 28.1 PG (ref 26.5–33)
MCHC RBC AUTO-ENTMCNC: 32.4 G/DL (ref 31.5–36.5)
MCV RBC AUTO: 87 FL (ref 78–100)
MONOCYTES # BLD AUTO: 0.3 10E3/UL (ref 0–1.3)
MONOCYTES NFR BLD AUTO: 6 %
NEUTROPHILS # BLD AUTO: 2.9 10E3/UL (ref 1.6–8.3)
NEUTROPHILS NFR BLD AUTO: 56 %
NRBC # BLD AUTO: 0 10E3/UL
NRBC BLD AUTO-RTO: 0 /100
PLATELET # BLD AUTO: 227 10E3/UL (ref 150–450)
POTASSIUM SERPL-SCNC: 4 MMOL/L (ref 3.4–5.3)
PROT SERPL-MCNC: 7.5 G/DL (ref 6.4–8.3)
RBC # BLD AUTO: 4.67 10E6/UL (ref 3.8–5.2)
SODIUM SERPL-SCNC: 139 MMOL/L (ref 135–145)
TROPONIN T SERPL HS-MCNC: 8 NG/L
WBC # BLD AUTO: 5.2 10E3/UL (ref 4–11)

## 2024-03-16 PROCEDURE — 250N000013 HC RX MED GY IP 250 OP 250 PS 637: Performed by: EMERGENCY MEDICINE

## 2024-03-16 PROCEDURE — 36415 COLL VENOUS BLD VENIPUNCTURE: CPT | Performed by: EMERGENCY MEDICINE

## 2024-03-16 PROCEDURE — 250N000011 HC RX IP 250 OP 636: Performed by: EMERGENCY MEDICINE

## 2024-03-16 PROCEDURE — 83690 ASSAY OF LIPASE: CPT | Performed by: EMERGENCY MEDICINE

## 2024-03-16 PROCEDURE — 74177 CT ABD & PELVIS W/CONTRAST: CPT

## 2024-03-16 PROCEDURE — 82248 BILIRUBIN DIRECT: CPT | Performed by: EMERGENCY MEDICINE

## 2024-03-16 PROCEDURE — 85025 COMPLETE CBC W/AUTO DIFF WBC: CPT | Performed by: EMERGENCY MEDICINE

## 2024-03-16 PROCEDURE — 80053 COMPREHEN METABOLIC PANEL: CPT | Performed by: EMERGENCY MEDICINE

## 2024-03-16 PROCEDURE — 84484 ASSAY OF TROPONIN QUANT: CPT | Performed by: EMERGENCY MEDICINE

## 2024-03-16 PROCEDURE — 96374 THER/PROPH/DIAG INJ IV PUSH: CPT

## 2024-03-16 PROCEDURE — 93005 ELECTROCARDIOGRAM TRACING: CPT | Performed by: EMERGENCY MEDICINE

## 2024-03-16 PROCEDURE — 80048 BASIC METABOLIC PNL TOTAL CA: CPT | Performed by: EMERGENCY MEDICINE

## 2024-03-16 PROCEDURE — 99285 EMERGENCY DEPT VISIT HI MDM: CPT | Mod: 25

## 2024-03-16 RX ORDER — FAMOTIDINE 20 MG/1
20 TABLET, FILM COATED ORAL 2 TIMES DAILY
Qty: 30 TABLET | Refills: 0 | Status: SHIPPED | OUTPATIENT
Start: 2024-03-16

## 2024-03-16 RX ORDER — IOPAMIDOL 755 MG/ML
76 INJECTION, SOLUTION INTRAVASCULAR ONCE
Status: COMPLETED | OUTPATIENT
Start: 2024-03-16 | End: 2024-03-16

## 2024-03-16 RX ORDER — MAGNESIUM HYDROXIDE/ALUMINUM HYDROXICE/SIMETHICONE 120; 1200; 1200 MG/30ML; MG/30ML; MG/30ML
15 SUSPENSION ORAL ONCE
Status: DISCONTINUED | OUTPATIENT
Start: 2024-03-16 | End: 2024-03-16

## 2024-03-16 RX ORDER — MAGNESIUM HYDROXIDE/ALUMINUM HYDROXICE/SIMETHICONE 120; 1200; 1200 MG/30ML; MG/30ML; MG/30ML
30 SUSPENSION ORAL ONCE
Status: COMPLETED | OUTPATIENT
Start: 2024-03-16 | End: 2024-03-16

## 2024-03-16 RX ADMIN — IOPAMIDOL 76 ML: 755 INJECTION, SOLUTION INTRAVENOUS at 14:14

## 2024-03-16 RX ADMIN — FAMOTIDINE 20 MG: 10 INJECTION, SOLUTION INTRAVENOUS at 13:04

## 2024-03-16 RX ADMIN — ALUMINUM HYDROXIDE, MAGNESIUM HYDROXIDE, AND SIMETHICONE 30 ML: 200; 200; 20 SUSPENSION ORAL at 13:04

## 2024-03-16 ASSESSMENT — ACTIVITIES OF DAILY LIVING (ADL)
ADLS_ACUITY_SCORE: 35

## 2024-03-16 ASSESSMENT — COLUMBIA-SUICIDE SEVERITY RATING SCALE - C-SSRS
2. HAVE YOU ACTUALLY HAD ANY THOUGHTS OF KILLING YOURSELF IN THE PAST MONTH?: NO
1. IN THE PAST MONTH, HAVE YOU WISHED YOU WERE DEAD OR WISHED YOU COULD GO TO SLEEP AND NOT WAKE UP?: NO
6. HAVE YOU EVER DONE ANYTHING, STARTED TO DO ANYTHING, OR PREPARED TO DO ANYTHING TO END YOUR LIFE?: NO

## 2024-03-16 NOTE — ED TRIAGE NOTES
Midsternal/epigastric pain that radiates to back and shoulders. Started about a week ago, intermittent, worse today. Dizziness, fatigue, sob when it comes. Pain makes her burp, after burping feels better. Heavy burning pain.

## 2024-03-16 NOTE — DISCHARGE INSTRUCTIONS
We discussed most likely her symptoms secondary to a problem with her stomach.  Recommend follow-up with primary care doctor for consideration of endoscopy.  In the meantime recommend Pepcid twice a day.  Also recommend follow primary care doctor for consideration of stress testing.  Your evaluation here in the ER is reassuring.  Return to ER for worsening symptom

## 2024-03-16 NOTE — ED PROVIDER NOTES
EMERGENCY DEPARTMENT ENCOUNTER      NAME: George Myrick  AGE: 68 year old female  YOB: 1955  MRN: [id number]  EVALUATION DATE & TIME: 3/16/2024 12:39 PM    PCP: System, Provider Not In    ED PROVIDER: Dariusz Bingham MD      Chief Complaint   Patient presents with    Chest Pain         FINAL IMPRESSION:  1. Epigastric pain    2. Burping          ED COURSE & MEDICAL DECISION MAKING:    Pertinent Labs & Imaging studies reviewed. (See chart for details)  68 year old female presents to the Emergency Department for evaluation of gastric pain that radiates to the back and shoulders.  Started a week ago.  Intermittently worse today intermittent dizziness and fatigue and shortness of breath.  Has been burping.  Burping makes her feel better.    Previous history of endoscopy according to son going to her son they found little spot.  Per review of Care Everywhere had endoscopy and February 15, 2019 that showed she had some candidal esophagitis as well is nonspecific chronic inflammation of her stomach    Differential includes ACS, pulmonary emboli, pneumonia, aortic dissection, esophageal rupture, pneumothorax, pneumonia, malignancy, pleurisy, shingles, and GERD.  Based on history and examination pulmonary emboli and aortic dissection unlikely.     Given GI cocktail and Pepcid with improvement in symptoms      Troponin, hepatic function panel, lipase, BMP, EKG, CT abdomen pelvis contrast    Unrevealing work up    Most likely gastritis.  Plan for Pepcid.  Recommend repeat endoscopy.  Recommend follow primary care doctor for consideration of stress testing  ED Course as of 03/16/24 1628   Sat Mar 16, 2024   1533 Troponin T, High Sensitivity: 8  Based on duration of symptoms delta troponin not indicated   1533 Lipase: 23   1534 WBC: 5.2   1534 Hemoglobin: 13.1   1534 Platelet Count: 227       Medical Decision Making  Obtained supplemental history:Supplemental history obtained?: Documented in chart and  Caregiver  Reviewed external records: External records reviewed?: Documented in chart  Care impacted by chronic illness:N/A  Care significantly affected by social determinants of health:Access to Medical Care  Did you consider but not order tests?: Work up considered but not performed and documented in chart, if applicable  Did you interpret images independently?: Independent interpretation of ECG and images noted in documentation, when applicable.  Consultation discussion with other provider:Did you involve another provider (consultant, , pharmacy, etc.)?: No  Discharge. I prescribed additional prescription strength medication(s) as charted. N/A.    At the conclusion of the encounter I discussed the results of all of the tests and the disposition. The questions were answered. The patient or family acknowledged understanding and was agreeable with the care plan.         MEDICATIONS GIVEN IN THE EMERGENCY:  Medications   alum & mag hydroxide-simethicone (MAALOX) suspension 30 mL (30 mLs Oral $Given 3/16/24 1304)   famotidine (PEPCID) injection 20 mg (20 mg Intravenous $Given 3/16/24 1304)   iopamidol (ISOVUE-370) solution 76 mL (76 mLs Intravenous $Given 3/16/24 1414)       NEW PRESCRIPTIONS STARTED AT TODAY'S ER VISIT  Discharge Medication List as of 3/16/2024  3:37 PM             =================================================================    HPI    Patient information was obtained from: Patient's son          George Myrick is a 68 year old female with a pertinent history of for thyroidism who presents to this ED  for evaluation of gastric chest pain that radiates to the back and shoulders.  Burping.  After she burps she feels better.      REVIEW OF SYSTEMS   Review of Systems     PAST MEDICAL HISTORY:  No past medical history on file.    PAST SURGICAL HISTORY:  No past surgical history on file.        CURRENT MEDICATIONS:    famotidine (PEPCID) 20 MG tablet  calcium carbonate (OS-GOLDIE) 600 mg calcium (1,500  mg) tablet  cetirizine (ZYRTEC) 10 MG tablet  cholecalciferol, vitamin D3, 50 mcg (2,000 unit) capsule  cyclobenzaprine (FLEXERIL) 5 MG tablet  diclofenac (VOLTAREN) 1 % topical gel  fluocinonide (LIDEX) 0.05 % cream  ketotifen (ZADITOR/ZYRTEC ITCHY EYES) 0.025 % (0.035 %) ophthalmic solution  levothyroxine (SYNTHROID/LEVOTHROID) 50 MCG tablet  methylPREDNISolone (MEDROL DOSEPACK) 4 mg tablet  polyethylene glycol (GLYCOLAX) 17 gram/dose powder  polyethylene glycol (MIRALAX) 17 GM/Dose powder        ALLERGIES:  No Known Allergies    FAMILY HISTORY:  No family history on file.    SOCIAL HISTORY:   Social History     Socioeconomic History    Marital status:    Tobacco Use    Smoking status: Never    Smokeless tobacco: Never       VITALS:  BP (!) 141/86   Pulse 79   Temp 98.7  F (37.1  C) (Oral)   Resp 23   Ht 1.524 m (5')   Wt 70 kg (154 lb 4.8 oz)   SpO2 97%   BMI 30.13 kg/m      PHYSICAL EXAM      Vitals: BP (!) 141/86   Pulse 79   Temp 98.7  F (37.1  C) (Oral)   Resp 23   Ht 1.524 m (5')   Wt 70 kg (154 lb 4.8 oz)   SpO2 97%   BMI 30.13 kg/m    General: Appears in no acute distress, awake, alert, interactive.  Eyes: Conjunctivae non-injected. Sclera anicteric.  HENT: Atraumatic.  Neck: Supple.  Respiratory/Chest: Respiration unlabored.  No wheezing  Heart: RRR  Abdomen: non distended, no abdominal tenderness  Musculoskeletal: Normal extremities. No edema or erythema.  Skin: Normal color. No rash or diaphoresis.  Neurologic: Face symmetric, moves all extremities spontaneously. Speech clear.  Psychiatric: Oriented to person, place, and time. Affect appropriate.    LAB:  All pertinent labs reviewed and interpreted.  Results for orders placed or performed during the hospital encounter of 03/16/24   CT Abdomen Pelvis w Contrast    Impression    IMPRESSION:   No acute abnormality in abdomen or pelvis.   Basic metabolic panel   Result Value Ref Range    Sodium 139 135 - 145 mmol/L    Potassium 4.0 3.4  - 5.3 mmol/L    Chloride 104 98 - 107 mmol/L    Carbon Dioxide (CO2) 25 22 - 29 mmol/L    Anion Gap 10 7 - 15 mmol/L    Urea Nitrogen 9.0 8.0 - 23.0 mg/dL    Creatinine 0.72 0.51 - 0.95 mg/dL    GFR Estimate 90 >60 mL/min/1.73m2    Calcium 9.9 8.8 - 10.2 mg/dL    Glucose 106 (H) 70 - 99 mg/dL   Hepatic function panel   Result Value Ref Range    Protein Total 7.5 6.4 - 8.3 g/dL    Albumin 4.1 3.5 - 5.2 g/dL    Bilirubin Total 0.3 <=1.2 mg/dL    Alkaline Phosphatase 112 40 - 150 U/L    AST 19 0 - 45 U/L    ALT 15 0 - 50 U/L    Bilirubin Direct <0.20 0.00 - 0.30 mg/dL   Result Value Ref Range    Lipase 23 13 - 60 U/L   Troponin T, High Sensitivity (now)   Result Value Ref Range    Troponin T, High Sensitivity 8 <=14 ng/L   CBC with platelets and differential   Result Value Ref Range    WBC Count 5.2 4.0 - 11.0 10e3/uL    RBC Count 4.67 3.80 - 5.20 10e6/uL    Hemoglobin 13.1 11.7 - 15.7 g/dL    Hematocrit 40.4 35.0 - 47.0 %    MCV 87 78 - 100 fL    MCH 28.1 26.5 - 33.0 pg    MCHC 32.4 31.5 - 36.5 g/dL    RDW 13.2 10.0 - 15.0 %    Platelet Count 227 150 - 450 10e3/uL    % Neutrophils 56 %    % Lymphocytes 37 %    % Monocytes 6 %    % Eosinophils 1 %    % Basophils 0 %    % Immature Granulocytes 0 %    NRBCs per 100 WBC 0 <1 /100    Absolute Neutrophils 2.9 1.6 - 8.3 10e3/uL    Absolute Lymphocytes 1.9 0.8 - 5.3 10e3/uL    Absolute Monocytes 0.3 0.0 - 1.3 10e3/uL    Absolute Eosinophils 0.0 0.0 - 0.7 10e3/uL    Absolute Basophils 0.0 0.0 - 0.2 10e3/uL    Absolute Immature Granulocytes 0.0 <=0.4 10e3/uL    Absolute NRBCs 0.0 10e3/uL       RADIOLOGY:  Reviewed all pertinent imaging. Please see official radiology report.  CT Abdomen Pelvis w Contrast   Final Result   IMPRESSION:    No acute abnormality in abdomen or pelvis.          EKG:    Performed at: 16 March 2024 1228    Impression: Sinus rhythm    Rate: 81  Rhythm: Sinus  Axis: -10  SC Interval: 160  QRS Interval: 76  QTc Interval: 422  ST Changes: No ST  changes  Comparison: Compared to 26 October 2021 nonspecific T wave changes no longer present    I have independently reviewed and interpreted the EKG(s) documented above.    PROCEDURES:           I, Sharonda Menard, am serving as a scribe to document services personally performed by Dariusz Bingham MD based on my observation and the provider's statements to me. I, Dariusz Bingham MD, attest that Sharonda Menard is acting in a scribe capacity, has observed my performance of the services and has documented them in accordance with my direction.    Dariusz Bingham MD  [facility] EMERGENCY DEPARTMENT  [address] [place] [id number]  [phone]       Dariusz Bingham MD  03/16/24 3471

## 2024-03-18 LAB
ATRIAL RATE - MUSE: 81 BPM
DIASTOLIC BLOOD PRESSURE - MUSE: NORMAL MMHG
INTERPRETATION ECG - MUSE: NORMAL
P AXIS - MUSE: 41 DEGREES
PR INTERVAL - MUSE: 160 MS
QRS DURATION - MUSE: 76 MS
QT - MUSE: 364 MS
QTC - MUSE: 422 MS
R AXIS - MUSE: -10 DEGREES
SYSTOLIC BLOOD PRESSURE - MUSE: NORMAL MMHG
T AXIS - MUSE: 20 DEGREES
VENTRICULAR RATE- MUSE: 81 BPM

## 2025-05-16 ENCOUNTER — OFFICE VISIT (OUTPATIENT)
Dept: URGENT CARE | Facility: URGENT CARE | Age: 70
End: 2025-05-16
Payer: MEDICARE

## 2025-05-16 VITALS
OXYGEN SATURATION: 95 % | TEMPERATURE: 97.8 F | RESPIRATION RATE: 18 BRPM | SYSTOLIC BLOOD PRESSURE: 122 MMHG | DIASTOLIC BLOOD PRESSURE: 75 MMHG | HEART RATE: 76 BPM

## 2025-05-16 DIAGNOSIS — K59.00 CONSTIPATION, UNSPECIFIED CONSTIPATION TYPE: ICD-10-CM

## 2025-05-16 DIAGNOSIS — K21.00 GASTROESOPHAGEAL REFLUX DISEASE WITH ESOPHAGITIS WITHOUT HEMORRHAGE: Primary | ICD-10-CM

## 2025-05-16 DIAGNOSIS — R42 DIZZINESS: ICD-10-CM

## 2025-05-16 PROCEDURE — 3078F DIAST BP <80 MM HG: CPT | Performed by: FAMILY MEDICINE

## 2025-05-16 PROCEDURE — 3074F SYST BP LT 130 MM HG: CPT | Performed by: FAMILY MEDICINE

## 2025-05-16 PROCEDURE — 99214 OFFICE O/P EST MOD 30 MIN: CPT | Performed by: FAMILY MEDICINE

## 2025-05-16 RX ORDER — FAMOTIDINE 20 MG/1
20 TABLET, FILM COATED ORAL 2 TIMES DAILY
Qty: 30 TABLET | Refills: 0 | Status: SHIPPED | OUTPATIENT
Start: 2025-05-16

## 2025-05-16 NOTE — PROGRESS NOTES
Urgent Care Clinic Visit    Chief Complaint   Patient presents with    Dizziness     Feeling tired, started yesterday, having stomach pain started yesterday, no diarrhea or constipation, aching in both foot x a while               5/16/2025     4:32 PM   Additional Questions   Roomed by Avelina OLSEN   Accompanied by Son in law     Avelina Mayes on 5/16/2025 at 4:34 PM

## 2025-05-17 ASSESSMENT — ENCOUNTER SYMPTOMS
DIZZINESS: 1
CONSTIPATION: 1

## 2025-05-17 NOTE — PROGRESS NOTES
Rooming Notes:    Patient presents with:  Dizziness: Feeling tired, started yesterday, having stomach pain started yesterday, no diarrhea or constipation, aching in both foot x a while        Physician Note:    Assessment/MDM:    George Myrick is a 69 year old female is here today for longstanding medical issues that have not worsened.  Reviewed excellent primary care plan as well as etiology of many of her issues.  She did not like the MiraLAX as she felt it made her GERD worse so I have prescribed her fiber to use instead.  Encouraged her to use Pepcid for the GERD and sleep on her left side.  And reviewed autonomic dysfunction and some vasovagal orthostatic hypotension with patient.  Follow-up with primary. Over 30  minutes was spent in the direct history taking, physical exam, note review, review of medical records and pertinent articles in up-to-date as well as creation of a plan to prevent significant morbidity and mortality in care. This time is excluding any treatments such as NEBS or IV fluid      HPI:  Tahiron-in-law is interpreting for mother-in-law here today.  Has several longstanding issues that she is here in urgent care for today.  The first is GERD, the second is intermittent dizziness when standing up the third is occasional constipation.  I have reviewed her last excellent primary care visit where all of these were addressed with plans as well.         Review of Systems   Gastrointestinal:  Positive for constipation.   Neurological:  Positive for dizziness.   All other systems reviewed and are negative.      Vitals:    05/16/25 1634   BP: 122/75   Pulse: 76   Resp: 18   Temp: 97.8  F (36.6  C)   TempSrc: Oral   SpO2: 95%       Physical Exam  Vitals and nursing note reviewed.   Cardiovascular:      Rate and Rhythm: Normal rate.   Pulmonary:      Effort: Pulmonary effort is normal.   Neurological:      Mental Status: She is alert.         Results:  No results found for any visits on  05/16/25.        Past Medical History: has been reviewed by me. I have also reviewed past visits, lab results and studies  Adverse Drug Reactions: Patient has no known allergies.    Medications: reviewed by me today    Family History: Reviewed by me today  Social History:   Social History     Tobacco Use    Smoking status: Never    Smokeless tobacco: Never   Substance Use Topics    Alcohol use: Not on file       Tobacco:   History   Smoking Status    Never   Smokeless Tobacco    Never         I have reviewed and recommended any over-the-counter medications that will aid in the symptomatic relief of this illness.    The risk of complications, morbidity, and/or mortality of patient management decisions were made during the visit with the patient. These may be associated with the patient s problems, the diagnostic procedures, or the treatment. This includes possible management options selected, as well options considered but ultimately not selected, after shared medical decision making with the patient and/or family.        ICD-10-CM    1. Gastroesophageal reflux disease with esophagitis without hemorrhage  K21.00 famotidine (PEPCID) 20 MG tablet     psyllium (METAMUCIL/KONSYL) 58.6 % powder      2. Dizziness  R42       3. Constipation, unspecified constipation type  K59.00            Dann Linda MD  5/17/2025, 4:08 PM.      Patient Instructions   Try sleeping on left side (for acid reflux)     Take fiber     Take pepcid (for acid reflux)     Try standing up slowly (for dizziness)